# Patient Record
Sex: FEMALE | Race: WHITE | NOT HISPANIC OR LATINO | Employment: OTHER | RURAL
[De-identification: names, ages, dates, MRNs, and addresses within clinical notes are randomized per-mention and may not be internally consistent; named-entity substitution may affect disease eponyms.]

---

## 2018-09-03 ENCOUNTER — OFFICE VISIT (OUTPATIENT)
Dept: RETAIL CLINIC | Facility: CLINIC | Age: 38
End: 2018-09-03

## 2018-09-03 VITALS
WEIGHT: 156 LBS | OXYGEN SATURATION: 98 % | HEART RATE: 80 BPM | BODY MASS INDEX: 30.63 KG/M2 | TEMPERATURE: 98.9 F | RESPIRATION RATE: 12 BRPM | HEIGHT: 60 IN

## 2018-09-03 DIAGNOSIS — R21 RASH: Primary | ICD-10-CM

## 2018-09-03 PROCEDURE — 99202 OFFICE O/P NEW SF 15 MIN: CPT | Performed by: NURSE PRACTITIONER

## 2018-09-03 RX ORDER — PREDNISONE 10 MG/1
TABLET ORAL DAILY
Qty: 21 EACH | Refills: 0 | Status: SHIPPED | OUTPATIENT
Start: 2018-09-03 | End: 2018-09-09

## 2018-09-03 RX ORDER — FLUTICASONE PROPIONATE 50 MCG
2 SPRAY, SUSPENSION (ML) NASAL DAILY
COMMUNITY
End: 2019-06-09

## 2018-09-03 RX ORDER — CETIRIZINE HYDROCHLORIDE 10 MG/1
10 TABLET ORAL DAILY
COMMUNITY
End: 2019-06-09

## 2018-09-03 RX ORDER — ERGOCALCIFEROL (VITAMIN D2) 10 MCG
400 TABLET ORAL DAILY
COMMUNITY
End: 2019-06-09

## 2018-09-03 NOTE — PROGRESS NOTES
"Subjective   Roxana Knutson is a 38 y.o. female.     Rash   This is a new problem. Episode onset: 5 days. The problem is unchanged. Location: bilat lower legs. The rash is characterized by redness and itchiness. Associated with: grass. Pertinent negatives include no anorexia, cough, fatigue, fever or shortness of breath. Past treatments include anti-itch cream. The treatment provided no relief. There is no history of allergies, asthma or eczema.        The following portions of the patient's history were reviewed and updated as appropriate: allergies, current medications, past medical history, past social history, past surgical history and problem list.    Review of Systems   Constitutional: Negative.  Negative for fatigue and fever.   Respiratory: Negative.  Negative for cough and shortness of breath.    Cardiovascular: Negative.    Gastrointestinal: Negative.  Negative for anorexia.   Skin: Positive for rash (itching red rash on bilat lower leges after kneeling in the grass, worsening, spreading, unresponsive to otc medication).   Hematological: Negative.  Negative for adenopathy.        Pulse 80   Temp 98.9 °F (37.2 °C) (Oral)   Resp 12   Ht 152.4 cm (60\")   Wt 70.8 kg (156 lb)   SpO2 98%   BMI 30.47 kg/m²      Objective   Physical Exam   Constitutional: She is oriented to person, place, and time. She appears well-developed and well-nourished. No distress.   Cardiovascular: Normal rate and regular rhythm.    Pulmonary/Chest: Effort normal and breath sounds normal. No respiratory distress.   Neurological: She is alert and oriented to person, place, and time.   Skin: Skin is warm and dry. Rash (papular, red rash on bilat anterior lower legs, no pain on palpation, no exudate or other sign of infection noted) noted.        Psychiatric: She has a normal mood and affect. Her behavior is normal. Thought content normal.   Vitals reviewed.      Assessment/Plan   Roxana was seen today for rash.    Diagnoses and all " orders for this visit:    Rash  -     hydrocortisone 2.5 % cream; Apply  topically to the appropriate area as directed 2 (Two) Times a Day.  -     PredniSONE (DELTASONE) 10 MG (21) tablet pack; Take  by mouth Daily for 6 days. Use as directed on package

## 2019-06-09 ENCOUNTER — OFFICE VISIT (OUTPATIENT)
Dept: RETAIL CLINIC | Facility: CLINIC | Age: 39
End: 2019-06-09

## 2019-06-09 VITALS
HEIGHT: 60 IN | TEMPERATURE: 97.1 F | DIASTOLIC BLOOD PRESSURE: 78 MMHG | BODY MASS INDEX: 30.63 KG/M2 | HEART RATE: 86 BPM | SYSTOLIC BLOOD PRESSURE: 108 MMHG | OXYGEN SATURATION: 99 % | WEIGHT: 156 LBS | RESPIRATION RATE: 14 BRPM

## 2019-06-09 DIAGNOSIS — J02.9 SORE THROAT: Primary | ICD-10-CM

## 2019-06-09 LAB
EXPIRATION DATE: NORMAL
INTERNAL CONTROL: NORMAL
Lab: NORMAL
S PYO AG THROAT QL: NEGATIVE

## 2019-06-09 PROCEDURE — 87880 STREP A ASSAY W/OPTIC: CPT | Performed by: NURSE PRACTITIONER

## 2019-06-09 PROCEDURE — 99213 OFFICE O/P EST LOW 20 MIN: CPT | Performed by: NURSE PRACTITIONER

## 2019-06-09 NOTE — PROGRESS NOTES
"Roseann Knutson is a 38 y.o. female.   /78   Pulse 86   Temp 97.1 °F (36.2 °C)   Resp 14   Ht 152.4 cm (60\")   Wt 70.8 kg (156 lb)   LMP  (LMP Unknown)   SpO2 99%   BMI 30.47 kg/m²       Fatigue   This is a new problem. The current episode started in the past 7 days. The problem has been waxing and waning. Associated symptoms include fatigue, headaches and a sore throat (mild). Pertinent negatives include no abdominal pain, anorexia, arthralgias, change in bowel habit, chest pain, chills, congestion, coughing, diaphoresis, fever, joint swelling, myalgias, nausea, neck pain, numbness, rash, swollen glands, urinary symptoms, vertigo, visual change, vomiting or weakness.        The following portions of the patient's history were reviewed and updated as appropriate: allergies, current medications, past family history, past medical history, past social history, past surgical history and problem list.    Review of Systems   Constitutional: Positive for fatigue. Negative for chills, diaphoresis and fever.   HENT: Positive for sore throat (mild). Negative for congestion.    Respiratory: Negative for cough.    Cardiovascular: Negative for chest pain.   Gastrointestinal: Negative for abdominal pain, anorexia, change in bowel habit, nausea and vomiting.   Musculoskeletal: Negative for arthralgias, joint swelling, myalgias and neck pain.   Skin: Negative for rash.   Neurological: Positive for headaches. Negative for vertigo, weakness and numbness.       Objective   Physical Exam   Constitutional: She appears well-developed and well-nourished.  Non-toxic appearance. She does not appear ill.   HENT:   Head: Normocephalic and atraumatic.   Right Ear: Tympanic membrane and ear canal normal.   Nose: Mucosal edema and rhinorrhea present. Right sinus exhibits no maxillary sinus tenderness and no frontal sinus tenderness. Left sinus exhibits no maxillary sinus tenderness and no frontal sinus tenderness. "   Mouth/Throat: Uvula is midline and mucous membranes are normal. Posterior oropharyngeal erythema (mild) present.   Left canal is 80% occluded with cerumen .    Cardiovascular: Regular rhythm and normal heart sounds.   Pulmonary/Chest: Effort normal. She has no wheezes. She has no rhonchi. She has no rales.   Lymphadenopathy:     She has no cervical adenopathy.   Skin: Skin is warm and dry.       Assessment/Plan   Roxana was seen today for sore throat, headache and earache.    Diagnoses and all orders for this visit:    Sore throat  -     POC Rapid Strep A      Results for orders placed or performed in visit on 06/09/19   POC Rapid Strep A   Result Value Ref Range    Rapid Strep A Screen Negative Negative, VALID, INVALID, Not Performed    Internal Control Passed Passed    Lot Number [id number]     Expiration Date 10,312,020          PT advised to follow up with PCP if symptoms worsen. Call clinic for cortisporin Rx if she is out at home, as she has a history of recurrent otitis externa and ear canal eczema.

## 2020-03-10 ENCOUNTER — LAB (OUTPATIENT)
Dept: LAB | Facility: HOSPITAL | Age: 40
End: 2020-03-10

## 2020-03-10 ENCOUNTER — TRANSCRIBE ORDERS (OUTPATIENT)
Dept: LAB | Facility: HOSPITAL | Age: 40
End: 2020-03-10

## 2020-03-10 DIAGNOSIS — N95.1 SYMPTOMATIC MENOPAUSAL OR FEMALE CLIMACTERIC STATES: ICD-10-CM

## 2020-03-10 DIAGNOSIS — N95.1 SYMPTOMATIC MENOPAUSAL OR FEMALE CLIMACTERIC STATES: Primary | ICD-10-CM

## 2020-03-10 LAB — ESTRADIOL SERPL HS-MCNC: 199 PG/ML

## 2020-03-10 PROCEDURE — 82670 ASSAY OF TOTAL ESTRADIOL: CPT

## 2020-03-10 PROCEDURE — 84403 ASSAY OF TOTAL TESTOSTERONE: CPT

## 2020-03-10 PROCEDURE — 84402 ASSAY OF FREE TESTOSTERONE: CPT

## 2020-03-10 PROCEDURE — 36415 COLL VENOUS BLD VENIPUNCTURE: CPT

## 2020-03-12 LAB
TESTOST FREE SERPL-MCNC: 1.1 PG/ML (ref 0–4.2)
TESTOST SERPL-MCNC: 49 NG/DL (ref 8–48)

## 2020-06-09 ENCOUNTER — LAB (OUTPATIENT)
Dept: LAB | Facility: HOSPITAL | Age: 40
End: 2020-06-09

## 2020-06-09 ENCOUNTER — TRANSCRIBE ORDERS (OUTPATIENT)
Dept: LAB | Facility: HOSPITAL | Age: 40
End: 2020-06-09

## 2020-06-09 DIAGNOSIS — N95.1 SYMPTOMATIC MENOPAUSAL OR FEMALE CLIMACTERIC STATES: Primary | ICD-10-CM

## 2020-06-09 DIAGNOSIS — N95.1 SYMPTOMATIC MENOPAUSAL OR FEMALE CLIMACTERIC STATES: ICD-10-CM

## 2020-06-09 PROCEDURE — 84402 ASSAY OF FREE TESTOSTERONE: CPT

## 2020-06-09 PROCEDURE — 36415 COLL VENOUS BLD VENIPUNCTURE: CPT

## 2020-06-09 PROCEDURE — 84403 ASSAY OF TOTAL TESTOSTERONE: CPT

## 2020-06-09 PROCEDURE — 82670 ASSAY OF TOTAL ESTRADIOL: CPT

## 2020-06-10 LAB
ESTRADIOL SERPL HS-MCNC: 159 PG/ML
TESTOST SERPL-MCNC: 28.5 NG/DL (ref 8.4–48.1)

## 2020-06-15 LAB — TESTOST FREE SERPL-MCNC: 1.1 PG/ML (ref 0–4.2)

## 2020-08-31 ENCOUNTER — LAB (OUTPATIENT)
Dept: LAB | Facility: HOSPITAL | Age: 40
End: 2020-08-31

## 2020-08-31 ENCOUNTER — TRANSCRIBE ORDERS (OUTPATIENT)
Dept: LAB | Facility: HOSPITAL | Age: 40
End: 2020-08-31

## 2020-08-31 DIAGNOSIS — N95.1 SYMPTOMATIC MENOPAUSAL OR FEMALE CLIMACTERIC STATES: ICD-10-CM

## 2020-08-31 DIAGNOSIS — N95.1 SYMPTOMATIC MENOPAUSAL OR FEMALE CLIMACTERIC STATES: Primary | ICD-10-CM

## 2020-08-31 LAB
ESTRADIOL SERPL HS-MCNC: 178 PG/ML
TESTOST SERPL-MCNC: 34.4 NG/DL (ref 8.4–48.1)

## 2020-08-31 PROCEDURE — 84402 ASSAY OF FREE TESTOSTERONE: CPT

## 2020-08-31 PROCEDURE — 82670 ASSAY OF TOTAL ESTRADIOL: CPT

## 2020-08-31 PROCEDURE — 36415 COLL VENOUS BLD VENIPUNCTURE: CPT

## 2020-08-31 PROCEDURE — 84403 ASSAY OF TOTAL TESTOSTERONE: CPT

## 2020-09-03 LAB — TESTOST FREE SERPL-MCNC: 0.5 PG/ML (ref 0–4.2)

## 2020-11-23 ENCOUNTER — LAB (OUTPATIENT)
Dept: LAB | Facility: HOSPITAL | Age: 40
End: 2020-11-23

## 2020-11-23 ENCOUNTER — TRANSCRIBE ORDERS (OUTPATIENT)
Dept: LAB | Facility: HOSPITAL | Age: 40
End: 2020-11-23

## 2020-11-23 DIAGNOSIS — N95.1 SYMPTOMATIC MENOPAUSAL OR FEMALE CLIMACTERIC STATES: Primary | ICD-10-CM

## 2020-11-23 DIAGNOSIS — N95.1 SYMPTOMATIC MENOPAUSAL OR FEMALE CLIMACTERIC STATES: ICD-10-CM

## 2020-11-23 PROCEDURE — 84402 ASSAY OF FREE TESTOSTERONE: CPT

## 2020-11-23 PROCEDURE — 84403 ASSAY OF TOTAL TESTOSTERONE: CPT

## 2020-11-23 PROCEDURE — 82670 ASSAY OF TOTAL ESTRADIOL: CPT

## 2020-11-23 PROCEDURE — 36415 COLL VENOUS BLD VENIPUNCTURE: CPT

## 2020-11-24 LAB
ESTRADIOL SERPL HS-MCNC: 182 PG/ML
TESTOST SERPL-MCNC: 42.4 NG/DL (ref 8.4–48.1)

## 2020-11-26 LAB — TESTOST FREE SERPL-MCNC: 2.1 PG/ML (ref 0–4.2)

## 2021-02-23 ENCOUNTER — LAB (OUTPATIENT)
Dept: LAB | Facility: HOSPITAL | Age: 41
End: 2021-02-23

## 2021-02-23 ENCOUNTER — TRANSCRIBE ORDERS (OUTPATIENT)
Dept: LAB | Facility: HOSPITAL | Age: 41
End: 2021-02-23

## 2021-02-23 DIAGNOSIS — N95.1 SYMPTOMATIC MENOPAUSAL OR FEMALE CLIMACTERIC STATES: Primary | ICD-10-CM

## 2021-02-23 DIAGNOSIS — Z79.890 NEED FOR PROPHYLACTIC HORMONE REPLACEMENT THERAPY (POSTMENOPAUSAL): ICD-10-CM

## 2021-02-23 DIAGNOSIS — N95.1 SYMPTOMATIC MENOPAUSAL OR FEMALE CLIMACTERIC STATES: ICD-10-CM

## 2021-02-23 PROCEDURE — 84403 ASSAY OF TOTAL TESTOSTERONE: CPT

## 2021-02-23 PROCEDURE — 84402 ASSAY OF FREE TESTOSTERONE: CPT

## 2021-02-23 PROCEDURE — 36415 COLL VENOUS BLD VENIPUNCTURE: CPT | Performed by: OBSTETRICS & GYNECOLOGY

## 2021-02-23 PROCEDURE — 82670 ASSAY OF TOTAL ESTRADIOL: CPT | Performed by: OBSTETRICS & GYNECOLOGY

## 2021-02-24 LAB — ESTRADIOL SERPL HS-MCNC: 199 PG/ML

## 2021-02-26 LAB
TESTOST FREE SERPL-MCNC: 1.7 PG/ML (ref 0–4.2)
TESTOST SERPL-MCNC: 46 NG/DL (ref 8–48)

## 2021-05-24 ENCOUNTER — LAB (OUTPATIENT)
Dept: LAB | Facility: HOSPITAL | Age: 41
End: 2021-05-24

## 2021-05-24 ENCOUNTER — TRANSCRIBE ORDERS (OUTPATIENT)
Dept: LAB | Facility: HOSPITAL | Age: 41
End: 2021-05-24

## 2021-05-24 DIAGNOSIS — N95.1 SYMPTOMATIC MENOPAUSAL OR FEMALE CLIMACTERIC STATES: Primary | ICD-10-CM

## 2021-05-24 DIAGNOSIS — Z79.890 NEED FOR PROPHYLACTIC HORMONE REPLACEMENT THERAPY (POSTMENOPAUSAL): ICD-10-CM

## 2021-05-24 DIAGNOSIS — N95.1 SYMPTOMATIC MENOPAUSAL OR FEMALE CLIMACTERIC STATES: ICD-10-CM

## 2021-05-24 LAB
ESTRADIOL SERPL HS-MCNC: 187 PG/ML
FSH SERPL-ACNC: 1.21 MIU/ML
TESTOST SERPL-MCNC: 39.7 NG/DL (ref 8.4–48.1)

## 2021-05-24 PROCEDURE — 84403 ASSAY OF TOTAL TESTOSTERONE: CPT

## 2021-05-24 PROCEDURE — 83001 ASSAY OF GONADOTROPIN (FSH): CPT

## 2021-05-24 PROCEDURE — 84402 ASSAY OF FREE TESTOSTERONE: CPT

## 2021-05-24 PROCEDURE — 82670 ASSAY OF TOTAL ESTRADIOL: CPT | Performed by: OBSTETRICS & GYNECOLOGY

## 2021-05-24 PROCEDURE — 36415 COLL VENOUS BLD VENIPUNCTURE: CPT | Performed by: OBSTETRICS & GYNECOLOGY

## 2021-05-29 LAB — TESTOST FREE SERPL-MCNC: 1.6 PG/ML (ref 0–4.2)

## 2021-08-17 ENCOUNTER — LAB (OUTPATIENT)
Dept: LAB | Facility: HOSPITAL | Age: 41
End: 2021-08-17

## 2021-08-17 ENCOUNTER — TRANSCRIBE ORDERS (OUTPATIENT)
Dept: LAB | Facility: HOSPITAL | Age: 41
End: 2021-08-17

## 2021-08-17 DIAGNOSIS — N95.1 SYMPTOMATIC MENOPAUSAL OR FEMALE CLIMACTERIC STATES: ICD-10-CM

## 2021-08-17 DIAGNOSIS — Z79.890 NEED FOR PROPHYLACTIC HORMONE REPLACEMENT THERAPY (POSTMENOPAUSAL): ICD-10-CM

## 2021-08-17 DIAGNOSIS — N95.1 SYMPTOMATIC MENOPAUSAL OR FEMALE CLIMACTERIC STATES: Primary | ICD-10-CM

## 2021-08-17 LAB
ESTRADIOL SERPL HS-MCNC: 187 PG/ML
FSH SERPL-ACNC: 0.49 MIU/ML
TESTOST SERPL-MCNC: 36.5 NG/DL (ref 8.4–48.1)

## 2021-08-17 PROCEDURE — 83001 ASSAY OF GONADOTROPIN (FSH): CPT

## 2021-08-17 PROCEDURE — 36415 COLL VENOUS BLD VENIPUNCTURE: CPT

## 2021-08-17 PROCEDURE — 82670 ASSAY OF TOTAL ESTRADIOL: CPT

## 2021-08-17 PROCEDURE — 84402 ASSAY OF FREE TESTOSTERONE: CPT

## 2021-08-17 PROCEDURE — 84403 ASSAY OF TOTAL TESTOSTERONE: CPT

## 2021-08-24 LAB — TESTOST FREE SERPL-MCNC: 0.6 PG/ML (ref 0–4.2)

## 2021-11-09 DIAGNOSIS — M54.32 SCIATICA OF LEFT SIDE: Primary | ICD-10-CM

## 2021-11-09 RX ORDER — GABAPENTIN 100 MG/1
100 CAPSULE ORAL NIGHTLY
Qty: 60 CAPSULE | Refills: 0 | Status: SHIPPED | OUTPATIENT
Start: 2021-11-09

## 2021-11-11 ENCOUNTER — TRANSCRIBE ORDERS (OUTPATIENT)
Dept: LAB | Facility: HOSPITAL | Age: 41
End: 2021-11-11

## 2021-11-11 ENCOUNTER — LAB (OUTPATIENT)
Dept: LAB | Facility: HOSPITAL | Age: 41
End: 2021-11-11

## 2021-11-11 DIAGNOSIS — N95.1 SYMPTOMATIC MENOPAUSAL OR FEMALE CLIMACTERIC STATES: Primary | ICD-10-CM

## 2021-11-11 DIAGNOSIS — Z79.890 NEED FOR PROPHYLACTIC HORMONE REPLACEMENT THERAPY (POSTMENOPAUSAL): ICD-10-CM

## 2021-11-11 LAB
ESTRADIOL SERPL HS-MCNC: 186 PG/ML
FSH SERPL-ACNC: 0.71 MIU/ML
TESTOST SERPL-MCNC: 48.1 NG/DL (ref 8.4–48.1)

## 2021-11-11 PROCEDURE — 82670 ASSAY OF TOTAL ESTRADIOL: CPT | Performed by: OBSTETRICS & GYNECOLOGY

## 2021-11-11 PROCEDURE — 84402 ASSAY OF FREE TESTOSTERONE: CPT | Performed by: OBSTETRICS & GYNECOLOGY

## 2021-11-11 PROCEDURE — 36415 COLL VENOUS BLD VENIPUNCTURE: CPT | Performed by: OBSTETRICS & GYNECOLOGY

## 2021-11-11 PROCEDURE — 83001 ASSAY OF GONADOTROPIN (FSH): CPT | Performed by: OBSTETRICS & GYNECOLOGY

## 2021-11-11 PROCEDURE — 84403 ASSAY OF TOTAL TESTOSTERONE: CPT | Performed by: OBSTETRICS & GYNECOLOGY

## 2021-11-13 LAB — TESTOST FREE SERPL-MCNC: 2.3 PG/ML (ref 0–4.2)

## 2022-02-07 ENCOUNTER — LAB (OUTPATIENT)
Dept: LAB | Facility: HOSPITAL | Age: 42
End: 2022-02-07

## 2022-02-07 ENCOUNTER — TRANSCRIBE ORDERS (OUTPATIENT)
Dept: LAB | Facility: HOSPITAL | Age: 42
End: 2022-02-07

## 2022-02-07 DIAGNOSIS — Z79.890 NEED FOR PROPHYLACTIC HORMONE REPLACEMENT THERAPY (POSTMENOPAUSAL): ICD-10-CM

## 2022-02-07 DIAGNOSIS — N95.1 SYMPTOMATIC MENOPAUSAL OR FEMALE CLIMACTERIC STATES: ICD-10-CM

## 2022-02-07 DIAGNOSIS — N95.1 SYMPTOMATIC MENOPAUSAL OR FEMALE CLIMACTERIC STATES: Primary | ICD-10-CM

## 2022-02-07 PROCEDURE — 83001 ASSAY OF GONADOTROPIN (FSH): CPT

## 2022-02-07 PROCEDURE — 84402 ASSAY OF FREE TESTOSTERONE: CPT

## 2022-02-07 PROCEDURE — 82670 ASSAY OF TOTAL ESTRADIOL: CPT | Performed by: OBSTETRICS & GYNECOLOGY

## 2022-02-07 PROCEDURE — 36415 COLL VENOUS BLD VENIPUNCTURE: CPT | Performed by: OBSTETRICS & GYNECOLOGY

## 2022-02-07 PROCEDURE — 84403 ASSAY OF TOTAL TESTOSTERONE: CPT

## 2022-02-08 LAB
ESTRADIOL SERPL HS-MCNC: 150 PG/ML
FSH SERPL-ACNC: 0.82 MIU/ML
TESTOST FREE SERPL-MCNC: 2.9 PG/ML (ref 0–4.2)
TESTOST SERPL-MCNC: 41.6 NG/DL (ref 8.4–48.1)

## 2022-05-02 ENCOUNTER — LAB (OUTPATIENT)
Dept: LAB | Facility: HOSPITAL | Age: 42
End: 2022-05-02

## 2022-05-02 ENCOUNTER — TRANSCRIBE ORDERS (OUTPATIENT)
Dept: LAB | Facility: HOSPITAL | Age: 42
End: 2022-05-02

## 2022-05-02 DIAGNOSIS — Z79.890 NEED FOR PROPHYLACTIC HORMONE REPLACEMENT THERAPY (POSTMENOPAUSAL): ICD-10-CM

## 2022-05-02 DIAGNOSIS — N95.1 SYMPTOMATIC MENOPAUSAL OR FEMALE CLIMACTERIC STATES: ICD-10-CM

## 2022-05-02 DIAGNOSIS — N95.1 SYMPTOMATIC MENOPAUSAL OR FEMALE CLIMACTERIC STATES: Primary | ICD-10-CM

## 2022-05-02 LAB
ESTRADIOL SERPL HS-MCNC: 182 PG/ML
FSH SERPL-ACNC: 0.6 MIU/ML
TESTOST SERPL-MCNC: 42.3 NG/DL (ref 8.4–48.1)

## 2022-05-02 PROCEDURE — 36415 COLL VENOUS BLD VENIPUNCTURE: CPT

## 2022-05-02 PROCEDURE — 84403 ASSAY OF TOTAL TESTOSTERONE: CPT

## 2022-05-02 PROCEDURE — 82670 ASSAY OF TOTAL ESTRADIOL: CPT

## 2022-05-02 PROCEDURE — 83001 ASSAY OF GONADOTROPIN (FSH): CPT

## 2022-05-02 PROCEDURE — 84402 ASSAY OF FREE TESTOSTERONE: CPT

## 2022-05-05 LAB — TESTOST FREE SERPL-MCNC: 1.6 PG/ML (ref 0–4.2)

## 2022-07-27 ENCOUNTER — TRANSCRIBE ORDERS (OUTPATIENT)
Dept: LAB | Facility: HOSPITAL | Age: 42
End: 2022-07-27

## 2022-07-27 ENCOUNTER — LAB (OUTPATIENT)
Dept: LAB | Facility: HOSPITAL | Age: 42
End: 2022-07-27

## 2022-07-27 DIAGNOSIS — N95.1 SYMPTOMATIC MENOPAUSAL OR FEMALE CLIMACTERIC STATES: Primary | ICD-10-CM

## 2022-07-27 DIAGNOSIS — Z79.890 NEED FOR PROPHYLACTIC HORMONE REPLACEMENT THERAPY (POSTMENOPAUSAL): ICD-10-CM

## 2022-07-27 DIAGNOSIS — N95.1 SYMPTOMATIC MENOPAUSAL OR FEMALE CLIMACTERIC STATES: ICD-10-CM

## 2022-07-27 LAB
ESTRADIOL SERPL HS-MCNC: 189 PG/ML
FSH SERPL-ACNC: 0.44 MIU/ML
TESTOST SERPL-MCNC: 41.4 NG/DL (ref 8.4–48.1)

## 2022-07-27 PROCEDURE — 36415 COLL VENOUS BLD VENIPUNCTURE: CPT | Performed by: OBSTETRICS & GYNECOLOGY

## 2022-07-27 PROCEDURE — 84402 ASSAY OF FREE TESTOSTERONE: CPT

## 2022-07-27 PROCEDURE — 82670 ASSAY OF TOTAL ESTRADIOL: CPT | Performed by: OBSTETRICS & GYNECOLOGY

## 2022-07-27 PROCEDURE — 84403 ASSAY OF TOTAL TESTOSTERONE: CPT

## 2022-07-27 PROCEDURE — 83001 ASSAY OF GONADOTROPIN (FSH): CPT

## 2022-08-01 ENCOUNTER — TRANSCRIBE ORDERS (OUTPATIENT)
Dept: ADMINISTRATIVE | Facility: HOSPITAL | Age: 42
End: 2022-08-01

## 2022-08-01 DIAGNOSIS — Z12.31 VISIT FOR SCREENING MAMMOGRAM: Primary | ICD-10-CM

## 2022-08-02 LAB — TESTOST FREE SERPL-MCNC: 1.6 PG/ML (ref 0–4.2)

## 2022-08-30 ENCOUNTER — HOSPITAL ENCOUNTER (OUTPATIENT)
Dept: MAMMOGRAPHY | Facility: HOSPITAL | Age: 42
Discharge: HOME | End: 2022-08-30
Admitting: OBSTETRICS & GYNECOLOGY

## 2022-08-30 DIAGNOSIS — Z12.31 VISIT FOR SCREENING MAMMOGRAM: ICD-10-CM

## 2022-08-30 PROCEDURE — 77067 SCR MAMMO BI INCL CAD: CPT | Performed by: RADIOLOGY

## 2022-08-30 PROCEDURE — 77063 BREAST TOMOSYNTHESIS BI: CPT

## 2022-08-30 PROCEDURE — 77067 SCR MAMMO BI INCL CAD: CPT

## 2022-08-30 PROCEDURE — 77063 BREAST TOMOSYNTHESIS BI: CPT | Performed by: RADIOLOGY

## 2022-10-27 ENCOUNTER — TRANSCRIBE ORDERS (OUTPATIENT)
Dept: LAB | Facility: HOSPITAL | Age: 42
End: 2022-10-27

## 2022-10-27 ENCOUNTER — LAB (OUTPATIENT)
Dept: LAB | Facility: HOSPITAL | Age: 42
End: 2022-10-27

## 2022-10-27 DIAGNOSIS — Z79.890 NEED FOR PROPHYLACTIC HORMONE REPLACEMENT THERAPY (POSTMENOPAUSAL): ICD-10-CM

## 2022-10-27 DIAGNOSIS — N95.1 SYMPTOMATIC MENOPAUSAL OR FEMALE CLIMACTERIC STATES: ICD-10-CM

## 2022-10-27 DIAGNOSIS — N95.1 SYMPTOMATIC MENOPAUSAL OR FEMALE CLIMACTERIC STATES: Primary | ICD-10-CM

## 2022-10-27 PROCEDURE — 36415 COLL VENOUS BLD VENIPUNCTURE: CPT

## 2022-10-27 PROCEDURE — 83001 ASSAY OF GONADOTROPIN (FSH): CPT

## 2022-10-27 PROCEDURE — 84402 ASSAY OF FREE TESTOSTERONE: CPT

## 2022-10-27 PROCEDURE — 82670 ASSAY OF TOTAL ESTRADIOL: CPT | Performed by: OBSTETRICS & GYNECOLOGY

## 2022-10-27 PROCEDURE — 84403 ASSAY OF TOTAL TESTOSTERONE: CPT

## 2022-10-28 LAB
ESTRADIOL SERPL HS-MCNC: 148 PG/ML
FSH SERPL-ACNC: 0.6 MIU/ML
TESTOST SERPL-MCNC: 31.6 NG/DL (ref 8.4–48.1)

## 2022-11-02 LAB — TESTOST FREE SERPL-MCNC: 0.9 PG/ML (ref 0–4.2)

## 2022-11-17 ENCOUNTER — HOSPITAL ENCOUNTER (OUTPATIENT)
Dept: MAMMOGRAPHY | Facility: HOSPITAL | Age: 42
Discharge: HOME | End: 2022-11-17

## 2022-11-17 ENCOUNTER — HOSPITAL ENCOUNTER (OUTPATIENT)
Dept: ULTRASOUND IMAGING | Facility: HOSPITAL | Age: 42
Discharge: HOME | End: 2022-11-17

## 2022-11-17 DIAGNOSIS — R92.8 ABNORMAL MAMMOGRAM: ICD-10-CM

## 2022-11-17 PROCEDURE — 77065 DX MAMMO INCL CAD UNI: CPT

## 2022-11-17 PROCEDURE — 76642 ULTRASOUND BREAST LIMITED: CPT

## 2022-11-17 PROCEDURE — 76642 ULTRASOUND BREAST LIMITED: CPT | Performed by: RADIOLOGY

## 2022-11-17 PROCEDURE — 77061 BREAST TOMOSYNTHESIS UNI: CPT | Performed by: RADIOLOGY

## 2022-11-17 PROCEDURE — 77065 DX MAMMO INCL CAD UNI: CPT | Performed by: RADIOLOGY

## 2022-11-17 PROCEDURE — G0279 TOMOSYNTHESIS, MAMMO: HCPCS

## 2023-02-01 RX ORDER — ONDANSETRON 4 MG/1
4 TABLET, FILM COATED ORAL EVERY 8 HOURS PRN
Qty: 20 TABLET | Refills: 1 | Status: SHIPPED | OUTPATIENT
Start: 2023-02-01

## 2023-02-01 RX ORDER — ONDANSETRON 4 MG/1
4 TABLET, ORALLY DISINTEGRATING ORAL EVERY 8 HOURS PRN
Qty: 12 TABLET | Refills: 1 | Status: SHIPPED | OUTPATIENT
Start: 2023-02-01

## 2023-03-29 ENCOUNTER — TRANSCRIBE ORDERS (OUTPATIENT)
Dept: CARDIOLOGY | Facility: CLINIC | Age: 43
End: 2023-03-29
Payer: COMMERCIAL

## 2023-03-29 DIAGNOSIS — R00.2 PALPITATIONS: Primary | ICD-10-CM

## 2023-10-10 ENCOUNTER — TRANSCRIBE ORDERS (OUTPATIENT)
Dept: ADMINISTRATIVE | Facility: HOSPITAL | Age: 43
End: 2023-10-10
Payer: COMMERCIAL

## 2023-10-10 DIAGNOSIS — Z12.31 SCREENING MAMMOGRAM FOR BREAST CANCER: Primary | ICD-10-CM

## 2023-11-22 ENCOUNTER — HOSPITAL ENCOUNTER (OUTPATIENT)
Dept: MAMMOGRAPHY | Facility: HOSPITAL | Age: 43
Discharge: HOME | End: 2023-11-22
Admitting: OBSTETRICS & GYNECOLOGY
Payer: COMMERCIAL

## 2023-11-22 DIAGNOSIS — Z12.31 SCREENING MAMMOGRAM FOR BREAST CANCER: ICD-10-CM

## 2023-11-22 PROCEDURE — 77063 BREAST TOMOSYNTHESIS BI: CPT

## 2023-11-22 PROCEDURE — 77067 SCR MAMMO BI INCL CAD: CPT

## 2024-08-16 ENCOUNTER — HOSPITAL ENCOUNTER (EMERGENCY)
Facility: HOSPITAL | Age: 44
Discharge: HOME | End: 2024-08-16
Attending: EMERGENCY MEDICINE
Payer: COMMERCIAL

## 2024-08-16 ENCOUNTER — APPOINTMENT (OUTPATIENT)
Facility: HOSPITAL | Age: 44
End: 2024-08-16
Payer: COMMERCIAL

## 2024-08-16 VITALS
TEMPERATURE: 97.9 F | DIASTOLIC BLOOD PRESSURE: 71 MMHG | BODY MASS INDEX: 31.02 KG/M2 | WEIGHT: 158 LBS | HEIGHT: 60 IN | HEART RATE: 85 BPM | SYSTOLIC BLOOD PRESSURE: 138 MMHG | RESPIRATION RATE: 18 BRPM | OXYGEN SATURATION: 99 %

## 2024-08-16 DIAGNOSIS — N23 RENAL COLIC: Primary | ICD-10-CM

## 2024-08-16 LAB
ALBUMIN SERPL-MCNC: 4.4 G/DL (ref 3.5–5.2)
ALBUMIN/GLOB SERPL: 1.9 G/DL
ALP SERPL-CCNC: 77 U/L (ref 39–117)
ALT SERPL W P-5'-P-CCNC: 24 U/L (ref 1–33)
ANION GAP SERPL CALCULATED.3IONS-SCNC: 11.3 MMOL/L (ref 5–15)
AST SERPL-CCNC: 24 U/L (ref 1–32)
BASOPHILS # BLD AUTO: 0.02 10*3/MM3 (ref 0–0.2)
BASOPHILS NFR BLD AUTO: 0.2 % (ref 0–1.5)
BILIRUB SERPL-MCNC: 0.5 MG/DL (ref 0–1.2)
BILIRUB UR QL STRIP: NEGATIVE
BUN SERPL-MCNC: 12 MG/DL (ref 6–20)
BUN/CREAT SERPL: 13.5 (ref 7–25)
CALCIUM SPEC-SCNC: 9.3 MG/DL (ref 8.6–10.5)
CHLORIDE SERPL-SCNC: 105 MMOL/L (ref 98–107)
CLARITY UR: CLEAR
CO2 SERPL-SCNC: 23.7 MMOL/L (ref 22–29)
COLOR UR: YELLOW
CREAT SERPL-MCNC: 0.89 MG/DL (ref 0.57–1)
DEPRECATED RDW RBC AUTO: 35.5 FL (ref 37–54)
EGFRCR SERPLBLD CKD-EPI 2021: 83.1 ML/MIN/1.73
EOSINOPHIL # BLD AUTO: 0.14 10*3/MM3 (ref 0–0.4)
EOSINOPHIL NFR BLD AUTO: 1.6 % (ref 0.3–6.2)
ERYTHROCYTE [DISTWIDTH] IN BLOOD BY AUTOMATED COUNT: 11.6 % (ref 12.3–15.4)
GLOBULIN UR ELPH-MCNC: 2.3 GM/DL
GLUCOSE SERPL-MCNC: 128 MG/DL (ref 65–99)
GLUCOSE UR STRIP-MCNC: NEGATIVE MG/DL
HCT VFR BLD AUTO: 43.8 % (ref 34–46.6)
HGB BLD-MCNC: 15.3 G/DL (ref 12–15.9)
HGB UR QL STRIP.AUTO: NEGATIVE
HOLD SPECIMEN: NORMAL
IMM GRANULOCYTES # BLD AUTO: 0.01 10*3/MM3 (ref 0–0.05)
IMM GRANULOCYTES NFR BLD AUTO: 0.1 % (ref 0–0.5)
KETONES UR QL STRIP: NEGATIVE
LEUKOCYTE ESTERASE UR QL STRIP.AUTO: NEGATIVE
LIPASE SERPL-CCNC: 21 U/L (ref 13–60)
LYMPHOCYTES # BLD AUTO: 2.7 10*3/MM3 (ref 0.7–3.1)
LYMPHOCYTES NFR BLD AUTO: 30 % (ref 19.6–45.3)
MCH RBC QN AUTO: 29.4 PG (ref 26.6–33)
MCHC RBC AUTO-ENTMCNC: 34.9 G/DL (ref 31.5–35.7)
MCV RBC AUTO: 84.1 FL (ref 79–97)
MONOCYTES # BLD AUTO: 0.76 10*3/MM3 (ref 0.1–0.9)
MONOCYTES NFR BLD AUTO: 8.4 % (ref 5–12)
NEUTROPHILS NFR BLD AUTO: 5.38 10*3/MM3 (ref 1.7–7)
NEUTROPHILS NFR BLD AUTO: 59.7 % (ref 42.7–76)
NITRITE UR QL STRIP: NEGATIVE
PH UR STRIP.AUTO: >=9 [PH] (ref 5–8)
PLATELET # BLD AUTO: 220 10*3/MM3 (ref 140–450)
PMV BLD AUTO: 11.2 FL (ref 6–12)
POTASSIUM SERPL-SCNC: 3.8 MMOL/L (ref 3.5–5.2)
PROT SERPL-MCNC: 6.7 G/DL (ref 6–8.5)
PROT UR QL STRIP: NEGATIVE
RBC # BLD AUTO: 5.21 10*6/MM3 (ref 3.77–5.28)
SODIUM SERPL-SCNC: 140 MMOL/L (ref 136–145)
SP GR UR STRIP: 1.01 (ref 1–1.03)
UROBILINOGEN UR QL STRIP: ABNORMAL
WBC NRBC COR # BLD AUTO: 9.01 10*3/MM3 (ref 3.4–10.8)
WHOLE BLOOD HOLD COAG: NORMAL
WHOLE BLOOD HOLD SPECIMEN: NORMAL

## 2024-08-16 PROCEDURE — 25010000002 HYDROMORPHONE PER 4 MG: Performed by: EMERGENCY MEDICINE

## 2024-08-16 PROCEDURE — 25010000002 KETOROLAC TROMETHAMINE PER 15 MG: Performed by: EMERGENCY MEDICINE

## 2024-08-16 PROCEDURE — 80053 COMPREHEN METABOLIC PANEL: CPT | Performed by: EMERGENCY MEDICINE

## 2024-08-16 PROCEDURE — 25010000002 ONDANSETRON PER 1 MG: Performed by: EMERGENCY MEDICINE

## 2024-08-16 PROCEDURE — 96376 TX/PRO/DX INJ SAME DRUG ADON: CPT

## 2024-08-16 PROCEDURE — 96374 THER/PROPH/DIAG INJ IV PUSH: CPT

## 2024-08-16 PROCEDURE — 74177 CT ABD & PELVIS W/CONTRAST: CPT

## 2024-08-16 PROCEDURE — 96375 TX/PRO/DX INJ NEW DRUG ADDON: CPT

## 2024-08-16 PROCEDURE — 25010000002 HYDROMORPHONE 1 MG/ML SOLUTION: Performed by: EMERGENCY MEDICINE

## 2024-08-16 PROCEDURE — 25510000001 IOPAMIDOL 61 % SOLUTION: Performed by: EMERGENCY MEDICINE

## 2024-08-16 PROCEDURE — 81003 URINALYSIS AUTO W/O SCOPE: CPT | Performed by: EMERGENCY MEDICINE

## 2024-08-16 PROCEDURE — 85025 COMPLETE CBC W/AUTO DIFF WBC: CPT | Performed by: EMERGENCY MEDICINE

## 2024-08-16 PROCEDURE — 83690 ASSAY OF LIPASE: CPT | Performed by: EMERGENCY MEDICINE

## 2024-08-16 PROCEDURE — 99285 EMERGENCY DEPT VISIT HI MDM: CPT

## 2024-08-16 RX ORDER — HYDROCODONE BITARTRATE AND ACETAMINOPHEN 7.5; 325 MG/1; MG/1
1-2 TABLET ORAL EVERY 6 HOURS PRN
Qty: 15 TABLET | Refills: 0 | Status: SHIPPED | OUTPATIENT
Start: 2024-08-16 | End: 2024-08-21

## 2024-08-16 RX ORDER — KETOROLAC TROMETHAMINE 15 MG/ML
15 INJECTION, SOLUTION INTRAMUSCULAR; INTRAVENOUS ONCE
Status: COMPLETED | OUTPATIENT
Start: 2024-08-16 | End: 2024-08-16

## 2024-08-16 RX ORDER — ONDANSETRON 4 MG/1
4 TABLET, ORALLY DISINTEGRATING ORAL EVERY 4 HOURS PRN
Qty: 15 TABLET | Refills: 0 | Status: SHIPPED | OUTPATIENT
Start: 2024-08-16 | End: 2024-08-21

## 2024-08-16 RX ORDER — ONDANSETRON 2 MG/ML
4 INJECTION INTRAMUSCULAR; INTRAVENOUS ONCE
Status: COMPLETED | OUTPATIENT
Start: 2024-08-16 | End: 2024-08-16

## 2024-08-16 RX ORDER — HYDROMORPHONE HYDROCHLORIDE 1 MG/ML
0.5 INJECTION, SOLUTION INTRAMUSCULAR; INTRAVENOUS; SUBCUTANEOUS ONCE
Status: COMPLETED | OUTPATIENT
Start: 2024-08-16 | End: 2024-08-16

## 2024-08-16 RX ORDER — SODIUM CHLORIDE 9 MG/ML
10 INJECTION, SOLUTION INTRAMUSCULAR; INTRAVENOUS; SUBCUTANEOUS AS NEEDED
Status: DISCONTINUED | OUTPATIENT
Start: 2024-08-16 | End: 2024-08-16 | Stop reason: HOSPADM

## 2024-08-16 RX ADMIN — HYDROMORPHONE HYDROCHLORIDE 0.5 MG: 1 INJECTION, SOLUTION INTRAMUSCULAR; INTRAVENOUS; SUBCUTANEOUS at 08:41

## 2024-08-16 RX ADMIN — HYDROMORPHONE HYDROCHLORIDE 0.5 MG: 1 INJECTION, SOLUTION INTRAMUSCULAR; INTRAVENOUS; SUBCUTANEOUS at 09:17

## 2024-08-16 RX ADMIN — HYDROMORPHONE HYDROCHLORIDE 1 MG: 1 INJECTION, SOLUTION INTRAMUSCULAR; INTRAVENOUS; SUBCUTANEOUS at 10:09

## 2024-08-16 RX ADMIN — ONDANSETRON 4 MG: 2 INJECTION INTRAMUSCULAR; INTRAVENOUS at 08:52

## 2024-08-16 RX ADMIN — KETOROLAC TROMETHAMINE 15 MG: 15 INJECTION, SOLUTION INTRAMUSCULAR; INTRAVENOUS at 07:55

## 2024-08-16 RX ADMIN — IOPAMIDOL 100 ML: 612 INJECTION, SOLUTION INTRAVENOUS at 08:02

## 2024-08-16 NOTE — FSED PROVIDER NOTE
"Subjective  History of Present Illness:    Patient presents with right lower quadrant abdominal pain that woke her up out of her sleep around 5:00 this morning.  Patient describes it as a sharp constant pain.  Patient has previous history of kidney stones however this feels completely different.  No associated nausea vomiting fevers chills no chest pain or shortness of breath      Nurses Notes reviewed and agree, including vitals, allergies, social history and prior medical history.     REVIEW OF SYSTEMS: All systems reviewed and not pertinent unless noted.  Review of Systems    Past Medical History:   Diagnosis Date    Asthma     Eczema of external ear     leads to otitis externa, sees ENT for this    Kidney stone     Migraines     Urinary tract infection        Allergies:    Patient has no known allergies.      Past Surgical History:   Procedure Laterality Date    HYSTERECTOMY  2018    OOPHORECTOMY Bilateral 2018    REDUCTION MAMMAPLASTY Bilateral     2014         Social History     Socioeconomic History    Marital status:    Tobacco Use    Smoking status: Never         Family History   Problem Relation Age of Onset    Cancer Mother     Breast cancer Maternal Grandmother         DX AGE 70'S    Cancer Maternal Grandmother     Diabetes Maternal Grandmother     Breast cancer Paternal Aunt 30    Cancer Maternal Grandfather     Diabetes Maternal Grandfather     Ovarian cancer Neg Hx        Objective  Physical Exam:  /71 (BP Location: Left arm, Patient Position: Sitting)   Pulse 85   Temp 97.9 °F (36.6 °C) (Oral)   Resp 18   Ht 152.4 cm (60\")   Wt 71.7 kg (158 lb)   LMP  (LMP Unknown)   SpO2 99%   BMI 30.86 kg/m²      Physical Exam  Vitals and nursing note reviewed.   Constitutional:       General: She is not in acute distress.     Appearance: Normal appearance. She is not ill-appearing, toxic-appearing or diaphoretic.   HENT:      Head: Normocephalic and atraumatic.      Nose: Nose normal.      " Mouth/Throat:      Mouth: Mucous membranes are moist.   Eyes:      Conjunctiva/sclera: Conjunctivae normal.      Pupils: Pupils are equal, round, and reactive to light.   Cardiovascular:      Rate and Rhythm: Normal rate and regular rhythm.      Pulses: Normal pulses.      Heart sounds: Normal heart sounds.   Pulmonary:      Effort: Pulmonary effort is normal.      Breath sounds: Normal breath sounds.   Abdominal:      General: Abdomen is flat.      Tenderness: There is abdominal tenderness in the right lower quadrant. There is guarding.   Musculoskeletal:         General: Normal range of motion.      Cervical back: Normal range of motion.   Skin:     General: Skin is warm and dry.      Capillary Refill: Capillary refill takes less than 2 seconds.   Neurological:      General: No focal deficit present.      Mental Status: She is alert and oriented to person, place, and time.   Psychiatric:         Mood and Affect: Mood normal.         Behavior: Behavior normal.         Procedures    ED Course:         Lab Results (last 24 hours)       Procedure Component Value Units Date/Time    CBC & Differential [[id number]]  (Abnormal) Collected: 08/16/24 0739    Specimen: Blood Updated: 08/16/24 0749    Narrative:      The following orders were created for panel order CBC & Differential.  Procedure                               Abnormality         Status                     ---------                               -----------         ------                     CBC Auto Differential[[id number]]        Abnormal            Final result                 Please view results for these tests on the individual orders.    Comprehensive Metabolic Panel [[id number]] Collected: 08/16/24 0739    Specimen: Blood Updated: 08/16/24 0744    Lipase [[id number]] Collected: 08/16/24 0739    Specimen: Blood Updated: 08/16/24 0744    CBC Auto Differential [[id number]]  (Abnormal) Collected: 08/16/24 0739    Specimen: Blood Updated: 08/16/24 0749     WBC  9.01 10*3/mm3      RBC 5.21 10*6/mm3      Hemoglobin 15.3 g/dL      Hematocrit 43.8 %      MCV 84.1 fL      MCH 29.4 pg      MCHC 34.9 g/dL      RDW 11.6 %      RDW-SD 35.5 fl      MPV 11.2 fL      Platelets 220 10*3/mm3      Neutrophil % 59.7 %      Lymphocyte % 30.0 %      Monocyte % 8.4 %      Eosinophil % 1.6 %      Basophil % 0.2 %      Immature Grans % 0.1 %      Neutrophils, Absolute 5.38 10*3/mm3      Lymphocytes, Absolute 2.70 10*3/mm3      Monocytes, Absolute 0.76 10*3/mm3      Eosinophils, Absolute 0.14 10*3/mm3      Basophils, Absolute 0.02 10*3/mm3      Immature Grans, Absolute 0.01 10*3/mm3              No radiology results from the last 24 hrs       MDM      In summary patient presenting with abdominal pain concern for possible bowel obstruction cholecystitis, gastritis, kidney stone, bowel perforation diverticulitis.  Patient given 15 IV Toradol for IV Zofran.  CBC CMP lipase all unremarkable for any acute process.  Patient still in discomfort upon reassessment patient given 0.5 mg IV Dilaudid followed by another dose.  Patient CAT scan abdomen pelvis shows 3 mm UVJ stone with mild hydronephrosis.  There was an incidental finding on the liver patient was informed of this finding and the need to follow-up with primary care for further outpatient workup.  Patient additional 1 mg Dilaudid IV.  Upon further reassessment patient was feeling much better.  Patient instructed on symptomatic management given a prescription for Norco and Zofran and to follow-up with primary care for the incidental finding on her liver.      Data interpreted: Nursing notes reviewed, vital signs reviewed.  Labs independently interpreted by me (CBC, CMP, lipase, UA, troponin, ABG, lactic acid, procalcitonin).  Imaging independently interpreted by me (x-ray, CT scan).  EKG independently interpreted by me.  O2 saturation:    Counseling: Discussed the results above with the patient regarding need for admission or discharge.  Patient  understands and agrees plan of care.      -----  ED Disposition       None          Final diagnoses:   None     Your Follow-Up Providers    Follow-up information has not been specified.       Contact information for after-discharge care    Follow-up information has not been specified.          Your medication list        ASK your doctor about these medications        Instructions Last Dose Given Next Dose Due   gabapentin 100 MG capsule  Commonly known as: NEURONTIN      Take 1 capsule by mouth Every Night. Take 1 at bedtime for a week, then 2 at bedtime       ondansetron 4 MG tablet  Commonly known as: Zofran      Take 1 tablet by mouth Every 8 (Eight) Hours As Needed for Nausea or Vomiting.       ondansetron ODT 4 MG disintegrating tablet  Commonly known as: ZOFRAN-ODT      Place 1 tablet on the tongue Every 8 (Eight) Hours As Needed for Nausea or Vomiting.

## 2024-08-21 ENCOUNTER — OFFICE VISIT (OUTPATIENT)
Dept: FAMILY MEDICINE CLINIC | Facility: CLINIC | Age: 44
End: 2024-08-21
Payer: COMMERCIAL

## 2024-08-21 VITALS
WEIGHT: 166.2 LBS | BODY MASS INDEX: 32.63 KG/M2 | TEMPERATURE: 97.7 F | OXYGEN SATURATION: 99 % | SYSTOLIC BLOOD PRESSURE: 100 MMHG | HEART RATE: 54 BPM | DIASTOLIC BLOOD PRESSURE: 68 MMHG | RESPIRATION RATE: 14 BRPM | HEIGHT: 60 IN

## 2024-08-21 DIAGNOSIS — Z00.00 ANNUAL PHYSICAL EXAM: Primary | ICD-10-CM

## 2024-08-21 DIAGNOSIS — Z13.29 SCREENING FOR ENDOCRINE DISORDER: ICD-10-CM

## 2024-08-21 DIAGNOSIS — R06.83 SNORING: ICD-10-CM

## 2024-08-21 DIAGNOSIS — K76.9 LESION OF LIVER GREATER THAN 1 CM IN DIAMETER: ICD-10-CM

## 2024-08-21 DIAGNOSIS — G47.9 RESTLESS SLEEPER: ICD-10-CM

## 2024-08-21 PROCEDURE — 99386 PREV VISIT NEW AGE 40-64: CPT | Performed by: NURSE PRACTITIONER

## 2024-08-21 PROCEDURE — 99213 OFFICE O/P EST LOW 20 MIN: CPT | Performed by: NURSE PRACTITIONER

## 2024-08-21 NOTE — PROGRESS NOTES
Patient Name: Roxana Knutson  : 1980   MRN: [id number]     Chief Complaint:    Chief Complaint   Patient presents with    Annual Exam       History of Present Illness: Roxana Knutson is a 44 y.o. female who is here today for their annual health maintenance and physical. Overall health is good  Went to oral surgery and found to have a possible narrow airway, concerned about this  She is still having palpitations at night, not waking feeling rested, snoring at nighttime, restless sleeping habits  26 found to have a kidney stone, and on ct incidental finding of liver lesion was present, needing further testing.    Owns her own business    No tobacco, alcohol, illicit drugs,  No eye correction  Dentist every 6 months  Well balanced diet.          Review of Systems:   Review of Systems   Psychiatric/Behavioral:  Positive for sleep disturbance.        Past Medical History, Social History, Family History and Care Team were all reviewed with patient and updated as appropriate.     Medications:   No current outpatient medications on file.    Allergies:   No Known Allergies      Depression: PHQ-2 Depression Screening  PHQ-2 Total Score: 0   PHQ-9 Total Score: 0     Intimate partner violence: (Screen on initial visit, pregnant women, women with injuries, older adult with injury or evidence of neglect):  Violence can be a problem in many people's lives, so I now ask every patient about trauma or abuse they may have experienced in a relationship.  Stress/Safety - Do you feel safe in your relationship?  Afraid/Abused - Have you ever been in a relationship where you were threatened, hurt, or afraid?  Friend/Family - Are your friends aware you have been hurt?  Emergency Plan - Do you have a safe place to go and the resources you need in an emergency?    Osteoporosis:   Ost menopausal women < 65 with RF (advancing age, previous fracture, glucocorticoid therapy, parental hip fracture, low body weight,  "current cigarette smoking, excessive alcohol consumption, rheumatoid arthritis, secondary osteoporosis [hypogonadism/premature menopause, malabsorption, chronic liver disease, IBD]).  All women 65 or older      Physical Exam:  Vital Signs:   Vitals:    08/21/24 1019   BP: 100/68   Pulse: 54   Resp: 14   Temp: 97.7 °F (36.5 °C)   SpO2: 99%   Weight: 75.4 kg (166 lb 3.2 oz)   Height: 152.4 cm (60\")     Body mass index is 32.46 kg/m².     Physical Exam  Vitals and nursing note reviewed.   Constitutional:       General: She is awake.      Appearance: Normal appearance. She is well-developed.   HENT:      Head: Normocephalic and atraumatic.      Right Ear: Tympanic membrane, ear canal and external ear normal.      Left Ear: Tympanic membrane, ear canal and external ear normal.      Nose: Nose normal.      Mouth/Throat:      Mouth: Mucous membranes are moist.      Pharynx: Oropharynx is clear.   Eyes:      Extraocular Movements: Extraocular movements intact.      Conjunctiva/sclera: Conjunctivae normal.      Pupils: Pupils are equal, round, and reactive to light.   Neck:      Thyroid: No thyromegaly.   Cardiovascular:      Rate and Rhythm: Normal rate and regular rhythm.      Pulses: Normal pulses.      Heart sounds: Normal heart sounds.   Pulmonary:      Effort: Pulmonary effort is normal.      Breath sounds: Normal breath sounds.   Abdominal:      General: Bowel sounds are normal.      Palpations: Abdomen is soft.   Musculoskeletal:         General: Normal range of motion.      Cervical back: Normal range of motion and neck supple.      Right lower leg: No edema.      Left lower leg: No edema.   Lymphadenopathy:      Cervical: No cervical adenopathy.   Skin:     General: Skin is warm.      Capillary Refill: Capillary refill takes less than 2 seconds.   Neurological:      General: No focal deficit present.      Mental Status: She is alert and oriented to person, place, and time.   Psychiatric:         Mood and Affect: " Mood normal.         Behavior: Behavior normal. Behavior is cooperative.         Thought Content: Thought content normal.         Judgment: Judgment normal.         Procedures      Assessment/Plan:   Diagnoses and all orders for this visit:    1. Annual physical exam (Primary)  Assessment & Plan:  Health maintenance:  Continue routine health maintenance including routine dentistry, eye exam, safety seatbelt use, exercise, and proper nutrition.   Exercise 3-4 times a week, 30-45 mins a day  Increase water intake  Monitor for acute illnesses      Orders:  -     Comprehensive Metabolic Panel  -     CBC Auto Differential  -     Hemoglobin A1c  -     T4, Free  -     TSH  -     Vitamin D,25-Hydroxy  -     Vitamin B12 & Folate    2. Screening for endocrine disorder    3. Snoring  Comments:  home sleep study ordered  Orders:  -     Home Sleep Study; Future    4. Restless sleeper  Comments:  home sleep study ordered  Orders:  -     Home Sleep Study; Future    5. Lesion of liver greater than 1 cm in diameter  Comments:  ordering MRCP for further diagnosis of liver lesion  Orders:  -     MRI abdomen w wo contrast mrcp; Future    Other orders  -     CBC & Differential       Health Maintenance, Female  Adopting a healthy lifestyle and getting preventive care can go a long way to promote health and wellness. Talk with your health care provider about what schedule of regular examinations is right for you. This is a good chance for you to check in with your provider about disease prevention and staying healthy.  In between checkups, there are plenty of things you can do on your own. Experts have done a lot of research about which lifestyle changes and preventive measures are most likely to keep you healthy. Ask your health care provider for more information.  Weight and diet  Eat a healthy diet  Be sure to include plenty of vegetables, fruits, low-fat dairy products, and lean protein.  Do not eat a lot of foods high in solid fats,  added sugars, or salt.  Get regular exercise. This is one of the most important things you can do for your health.  Most adults should exercise for at least 150 minutes each week. The exercise should increase your heart rate and make you sweat (moderate-intensity exercise).  Most adults should also do strengthening exercises at least twice a week. This is in addition to the moderate-intensity exercise.     Maintain a healthy weight  Body mass index (BMI) is a measurement that can be used to identify possible weight problems. It estimates body fat based on height and weight. Your health care provider can help determine your BMI and help you achieve or maintain a healthy weight.  For females 20 years of age and older:  A BMI below 18.5 is considered underweight.  A BMI of 18.5 to 24.9 is normal.  A BMI of 25 to 29.9 is considered overweight.  A BMI of 30 and above is considered obese.     Watch levels of cholesterol and blood lipids  You should start having your blood tested for lipids and cholesterol at 20 years of age, then have this test every 5 years.  You may need to have your cholesterol levels checked more often if:  Your lipid or cholesterol levels are high.  You are older than 50 years of age.  You are at high risk for heart disease.     Cancer screening  Lung Cancer  Lung cancer screening is recommended for adults 55-80 years old who are at high risk for lung cancer because of a history of smoking.  A yearly low-dose CT scan of the lungs is recommended for people who:  Currently smoke.  Have quit within the past 15 years.  Have at least a 30-pack-year history of smoking. A pack year is smoking an average of one pack of cigarettes a day for 1 year.  Yearly screening should continue until it has been 15 years since you quit.  Yearly screening should stop if you develop a health problem that would prevent you from having lung cancer treatment.     Breast Cancer  Practice breast self-awareness. This means  understanding how your breasts normally appear and feel.  It also means doing regular breast self-exams. Let your health care provider know about any changes, no matter how small.  If you are in your 20s or 30s, you should have a clinical breast exam (CBE) by a health care provider every 1-3 years as part of a regular health exam.  If you are 40 or older, have a CBE every year. Also consider having a breast X-ray (mammogram) every year.  If you have a family history of breast cancer, talk to your health care provider about genetic screening.  If you are at high risk for breast cancer, talk to your health care provider about having an MRI and a mammogram every year.  Breast cancer gene (BRCA) assessment is recommended for women who have family members with BRCA-related cancers. BRCA-related cancers include:  Breast.  Ovarian.  Tubal.  Peritoneal cancers.  Results of the assessment will determine the need for genetic counseling and BRCA1 and BRCA2 testing.     Cervical Cancer  Your health care provider may recommend that you be screened regularly for cancer of the pelvic organs (ovaries, uterus, and vagina). This screening involves a pelvic examination, including checking for microscopic changes to the surface of your cervix (Pap test). You may be encouraged to have this screening done every 3 years, beginning at age 21.  For women ages 30-65, health care providers may recommend pelvic exams and Pap testing every 3 years, or they may recommend the Pap and pelvic exam, combined with testing for human papilloma virus (HPV), every 5 years. Some types of HPV increase your risk of cervical cancer. Testing for HPV may also be done on women of any age with unclear Pap test results.  Other health care providers may not recommend any screening for nonpregnant women who are considered low risk for pelvic cancer and who do not have symptoms. Ask your health care provider if a screening pelvic exam is right for you.  If you have  had past treatment for cervical cancer or a condition that could lead to cancer, you need Pap tests and screening for cancer for at least 20 years after your treatment. If Pap tests have been discontinued, your risk factors (such as having a new sexual partner) need to be reassessed to determine if screening should resume. Some women have medical problems that increase the chance of getting cervical cancer. In these cases, your health care provider may recommend more frequent screening and Pap tests.     Colorectal Cancer  This type of cancer can be detected and often prevented.  Routine colorectal cancer screening usually begins at 50 years of age and continues through 75 years of age.  Your health care provider may recommend screening at an earlier age if you have risk factors for colon cancer.  Your health care provider may also recommend using home test kits to check for hidden blood in the stool.  A small camera at the end of a tube can be used to examine your colon directly (sigmoidoscopy or colonoscopy). This is done to check for the earliest forms of colorectal cancer.  Routine screening usually begins at age 50.  Direct examination of the colon should be repeated every 5-10 years through 75 years of age. However, you may need to be screened more often if early forms of precancerous polyps or small growths are found.     Skin Cancer  Check your skin from head to toe regularly.  Tell your health care provider about any new moles or changes in moles, especially if there is a change in a mole's shape or color.  Also tell your health care provider if you have a mole that is larger than the size of a pencil eraser.  Always use sunscreen. Apply sunscreen liberally and repeatedly throughout the day.  Protect yourself by wearing long sleeves, pants, a wide-brimmed hat, and sunglasses whenever you are outside.     Heart disease, diabetes, and high blood pressure  High blood pressure causes heart disease and increases  the risk of stroke. High blood pressure is more likely to develop in:  People who have blood pressure in the high end of the normal range (130-139/85-89 mm Hg).  People who are overweight or obese.  People who are .  If you are 18-39 years of age, have your blood pressure checked every 3-5 years. If you are 40 years of age or older, have your blood pressure checked every year. You should have your blood pressure measured twice--once when you are at a hospital or clinic, and once when you are not at a hospital or clinic. Record the average of the two measurements. To check your blood pressure when you are not at a hospital or clinic, you can use:  An automated blood pressure machine at a pharmacy.  A home blood pressure monitor.  If you are between 55 years and 79 years old, ask your health care provider if you should take aspirin to prevent strokes.  Have regular diabetes screenings. This involves taking a blood sample to check your fasting blood sugar level.  If you are at a normal weight and have a low risk for diabetes, have this test once every three years after 45 years of age.  If you are overweight and have a high risk for diabetes, consider being tested at a younger age or more often.  Preventing infection  Hepatitis B  If you have a higher risk for hepatitis B, you should be screened for this virus. You are considered at high risk for hepatitis B if:  You were born in a country where hepatitis B is common. Ask your health care provider which countries are considered high risk.  Your parents were born in a high-risk country, and you have not been immunized against hepatitis B (hepatitis B vaccine).  You have HIV or AIDS.  You use needles to inject street drugs.  You live with someone who has hepatitis B.  You have had sex with someone who has hepatitis B.  You get hemodialysis treatment.  You take certain medicines for conditions, including cancer, organ transplantation, and autoimmune  conditions.     Hepatitis C  Blood testing is recommended for:  Everyone born from 1945 through 1965.  Anyone with known risk factors for hepatitis C.     Sexually transmitted infections (STIs)  You should be screened for sexually transmitted infections (STIs) including gonorrhea and chlamydia if:  You are sexually active and are younger than 24 years of age.  You are older than 24 years of age and your health care provider tells you that you are at risk for this type of infection.  Your sexual activity has changed since you were last screened and you are at an increased risk for chlamydia or gonorrhea. Ask your health care provider if you are at risk.  If you do not have HIV, but are at risk, it may be recommended that you take a prescription medicine daily to prevent HIV infection. This is called pre-exposure prophylaxis (PrEP). You are considered at risk if:  You are sexually active and do not regularly use condoms or know the HIV status of your partner(s).  You take drugs by injection.  You are sexually active with a partner who has HIV.     Talk with your health care provider about whether you are at high risk of being infected with HIV. If you choose to begin PrEP, you should first be tested for HIV. You should then be tested every 3 months for as long as you are taking PrEP.  Pregnancy  If you are premenopausal and you may become pregnant, ask your health care provider about preconception counseling.  If you may become pregnant, take 400 to 800 micrograms (mcg) of folic acid every day.  If you want to prevent pregnancy, talk to your health care provider about birth control (contraception).  Osteoporosis and menopause  Osteoporosis is a disease in which the bones lose minerals and strength with aging. This can result in serious bone fractures. Your risk for osteoporosis can be identified using a bone density scan.  If you are 65 years of age or older, or if you are at risk for osteoporosis and fractures, ask  your health care provider if you should be screened.  Ask your health care provider whether you should take a calcium or vitamin D supplement to lower your risk for osteoporosis.  Menopause may have certain physical symptoms and risks.  Hormone replacement therapy may reduce some of these symptoms and risks.  Talk to your health care provider about whether hormone replacement therapy is right for you.  Follow these instructions at home:  Schedule regular health, dental, and eye exams.  Stay current with your immunizations.  Do not use any tobacco products including cigarettes, chewing tobacco, or electronic cigarettes.  If you are pregnant, do not drink alcohol.  If you are breastfeeding, limit how much and how often you drink alcohol.  Limit alcohol intake to no more than 1 drink per day for nonpregnant women. One drink equals 12 ounces of beer, 5 ounces of wine, or 1½ ounces of hard liquor.  Do not use street drugs.  Do not share needles.  Ask your health care provider for help if you need support or information about quitting drugs.  Tell your health care provider if you often feel depressed.  Tell your health care provider if you have ever been abused or do not feel safe at home.  This information is not intended to replace advice given to you by your health care provider. Make sure you discuss any questions you have with your health care provider.  Document Released: 07/02/2012 Document Revised: 05/25/2017 Document Reviewed: 09/20/2016  [employer] Patient Education © 2018 [employer]      Follow Up:   Return in about 4 weeks (around 9/18/2024).    Healthcare Maintenance:   Counseling provided on preventative care and vaccines.   Roxana Knutson voices understanding and acceptance of this advice and will call back with any further questions or concerns. AVS with preventive healthcare tips printed for patient.     Laureen Kerns. PINKY   [facility]

## 2024-08-22 LAB
25(OH)D3+25(OH)D2 SERPL-MCNC: 31.9 NG/ML (ref 30–100)
ALBUMIN SERPL-MCNC: 4.1 G/DL (ref 3.9–4.9)
ALP SERPL-CCNC: 69 IU/L (ref 44–121)
ALT SERPL-CCNC: 25 IU/L (ref 0–32)
AST SERPL-CCNC: 19 IU/L (ref 0–40)
BASOPHILS # BLD AUTO: 0 X10E3/UL (ref 0–0.2)
BASOPHILS NFR BLD AUTO: 0 %
BILIRUB SERPL-MCNC: <0.2 MG/DL (ref 0–1.2)
BUN SERPL-MCNC: 16 MG/DL (ref 6–24)
BUN/CREAT SERPL: 17 (ref 9–23)
CALCIUM SERPL-MCNC: 9.4 MG/DL (ref 8.7–10.2)
CHLORIDE SERPL-SCNC: 105 MMOL/L (ref 96–106)
CO2 SERPL-SCNC: 25 MMOL/L (ref 20–29)
CREAT SERPL-MCNC: 0.96 MG/DL (ref 0.57–1)
EGFRCR SERPLBLD CKD-EPI 2021: 76 ML/MIN/1.73
EOSINOPHIL # BLD AUTO: 0.1 X10E3/UL (ref 0–0.4)
EOSINOPHIL NFR BLD AUTO: 2 %
ERYTHROCYTE [DISTWIDTH] IN BLOOD BY AUTOMATED COUNT: 12.1 % (ref 11.7–15.4)
FOLATE SERPL-MCNC: 16.7 NG/ML
GLOBULIN SER CALC-MCNC: 1.8 G/DL (ref 1.5–4.5)
GLUCOSE SERPL-MCNC: 83 MG/DL (ref 70–99)
HBA1C MFR BLD: 5.5 % (ref 4.8–5.6)
HCT VFR BLD AUTO: 43.7 % (ref 34–46.6)
HGB BLD-MCNC: 14.3 G/DL (ref 11.1–15.9)
IMM GRANULOCYTES # BLD AUTO: 0 X10E3/UL (ref 0–0.1)
IMM GRANULOCYTES NFR BLD AUTO: 1 %
LYMPHOCYTES # BLD AUTO: 1.5 X10E3/UL (ref 0.7–3.1)
LYMPHOCYTES NFR BLD AUTO: 25 %
MCH RBC QN AUTO: 29.5 PG (ref 26.6–33)
MCHC RBC AUTO-ENTMCNC: 32.7 G/DL (ref 31.5–35.7)
MCV RBC AUTO: 90 FL (ref 79–97)
MONOCYTES # BLD AUTO: 0.3 X10E3/UL (ref 0.1–0.9)
MONOCYTES NFR BLD AUTO: 6 %
NEUTROPHILS # BLD AUTO: 4 X10E3/UL (ref 1.4–7)
NEUTROPHILS NFR BLD AUTO: 66 %
PLATELET # BLD AUTO: 215 X10E3/UL (ref 150–450)
POTASSIUM SERPL-SCNC: 4.4 MMOL/L (ref 3.5–5.2)
PROT SERPL-MCNC: 5.9 G/DL (ref 6–8.5)
RBC # BLD AUTO: 4.85 X10E6/UL (ref 3.77–5.28)
SODIUM SERPL-SCNC: 141 MMOL/L (ref 134–144)
T4 FREE SERPL-MCNC: 0.99 NG/DL (ref 0.82–1.77)
TSH SERPL DL<=0.005 MIU/L-ACNC: 2.9 UIU/ML (ref 0.45–4.5)
VIT B12 SERPL-MCNC: 837 PG/ML (ref 232–1245)
WBC # BLD AUTO: 6 X10E3/UL (ref 3.4–10.8)

## 2024-08-29 PROBLEM — Z00.00 ANNUAL PHYSICAL EXAM: Status: ACTIVE | Noted: 2024-08-29

## 2024-09-05 DIAGNOSIS — G47.33 OSA (OBSTRUCTIVE SLEEP APNEA): Primary | ICD-10-CM

## 2024-11-26 ENCOUNTER — OFFICE VISIT (OUTPATIENT)
Dept: SLEEP MEDICINE | Facility: CLINIC | Age: 44
End: 2024-11-26
Payer: COMMERCIAL

## 2024-11-26 VITALS
DIASTOLIC BLOOD PRESSURE: 68 MMHG | HEIGHT: 60 IN | WEIGHT: 173 LBS | TEMPERATURE: 98.4 F | HEART RATE: 68 BPM | SYSTOLIC BLOOD PRESSURE: 100 MMHG | OXYGEN SATURATION: 99 % | BODY MASS INDEX: 33.96 KG/M2

## 2024-11-26 DIAGNOSIS — G47.33 OSA (OBSTRUCTIVE SLEEP APNEA): Primary | ICD-10-CM

## 2024-11-26 PROCEDURE — 99213 OFFICE O/P EST LOW 20 MIN: CPT | Performed by: NURSE PRACTITIONER

## 2024-11-26 RX ORDER — CETIRIZINE HYDROCHLORIDE 5 MG/1
5 TABLET ORAL DAILY
COMMUNITY

## 2024-11-26 NOTE — PROGRESS NOTES
Chief Complaint:   Chief Complaint   Patient presents with    Sleeping Problem    Snoring       HPI:    Roxana Knutson is a 44 y.o. female here to establish care.  Patient sees PINKY Singh for care.  Patient has snoring, excessive daytime sleepiness, 3-4 morning headaches a week, nonrestorative sleep, heartburn, and sore throat.  Patient was told she had a narrow airway by her dentist and was ordered an HST.  HST did show mild sleep apnea.  We did speak today about the consequences of untreated sleep apnea such as hypertension, atrial fibrillation, stroke, early onset dementia and sudden cardiac death.  We also discussed different therapies available to her such as CPAP and MAD.  Patient does wish to initiate CPAP therapy.    On weekday going to bed at midnight getting up at 9 AM.  On weekend going to bed at midnight getting up at 9:30 AM.  She estimates she is getting 9 hours of sleep nightly.  She goes to sleep within 10 minutes and does go to sleep after taking a cornbread hemp gummy.  She does carry a diagnosis of IC and will get up x 1 to use the restroom.  Patient has an Bondurant score at 12/24.    Social history  This very pleasant 44-year-old female.  Patient is self-employed.  Patient is a non-smoker and will have an alcoholic beverage maybe 3 times a year.  Patient has no caffeine intake.    Family History   Problem Relation Age of Onset    Cancer Mother     Kidney disease Mother     Arthritis Father     Thyroid disease Sister     Breast cancer Paternal Aunt 30    Breast cancer Maternal Grandmother         DX AGE 70'S    Cancer Maternal Grandmother     Diabetes Maternal Grandmother     Cancer Maternal Grandfather     Diabetes Maternal Grandfather     Heart attack Maternal Grandfather     Ovarian cancer Neg Hx      Past Surgical History:   Procedure Laterality Date    HYSTERECTOMY  2018    OOPHORECTOMY Bilateral 2018    REDUCTION MAMMAPLASTY Bilateral     2014             Current medications are:  "  Current Outpatient Medications:     cetirizine (zyrTEC) 5 MG tablet, Take 1 tablet by mouth Daily., Disp: , Rfl: .      The patient's relevant past medical, surgical, family and social history were reviewed and updated in Epic as appropriate.       Review of Systems   Constitutional:  Positive for fatigue.   HENT:  Positive for congestion, dental problem, ear discharge, ear pain, sinus pressure and sneezing.    Respiratory:  Positive for choking.    Cardiovascular:  Positive for palpitations.   Genitourinary:  Positive for flank pain.   Allergic/Immunologic: Positive for environmental allergies.   Psychiatric/Behavioral:  Positive for decreased concentration and sleep disturbance.    All other systems reviewed and are negative.        Objective:    Physical Exam  Constitutional:       Appearance: Normal appearance.   HENT:      Head: Normocephalic and atraumatic.      Mouth/Throat:      Mouth: Mucous membranes are moist.      Pharynx: Oropharynx is clear.      Comments: Mallampati 3 anatomy  Cardiovascular:      Rate and Rhythm: Regular rhythm. Bradycardia present.   Pulmonary:      Effort: Pulmonary effort is normal.      Breath sounds: Normal breath sounds.   Skin:     General: Skin is warm and dry.   Neurological:      Mental Status: She is alert and oriented to person, place, and time.   Psychiatric:         Mood and Affect: Mood normal.         Behavior: Behavior normal.         Thought Content: Thought content normal.         Judgment: Judgment normal.         /68   Pulse 68   Temp 98.4 °F (36.9 °C)   Ht 152.4 cm (60\")   Wt 78.5 kg (173 lb)   LMP  (LMP Unknown)   SpO2 99%   BMI 33.79 kg/m²       ASSESSMENT/PLAN    Diagnoses and all orders for this visit:    1. EJSUS (obstructive sleep apnea) (Primary)  -     PAP Therapy      Order to initiate CPAP therapy and I will see patient back in 31 to 90 days.      Signed by  PINKY Hines    November 26, 2024      CC: Laureen Kerns APRN         " [place], Laureen CUELLAR, PINKY

## 2024-12-03 ENCOUNTER — TRANSCRIBE ORDERS (OUTPATIENT)
Dept: ADMINISTRATIVE | Facility: HOSPITAL | Age: 44
End: 2024-12-03
Payer: COMMERCIAL

## 2024-12-03 DIAGNOSIS — Z12.31 VISIT FOR SCREENING MAMMOGRAM: Primary | ICD-10-CM

## 2024-12-26 LAB
NCCN CRITERIA FLAG: ABNORMAL
TYRER CUZICK SCORE: 16.5

## 2024-12-27 ENCOUNTER — HOSPITAL ENCOUNTER (OUTPATIENT)
Facility: HOSPITAL | Age: 44
Discharge: HOME | End: 2024-12-27
Payer: COMMERCIAL

## 2024-12-27 DIAGNOSIS — Z12.31 VISIT FOR SCREENING MAMMOGRAM: ICD-10-CM

## 2024-12-27 DIAGNOSIS — K76.9 LESION OF LIVER GREATER THAN 1 CM IN DIAMETER: ICD-10-CM

## 2024-12-27 PROCEDURE — A9577 INJ MULTIHANCE: HCPCS | Performed by: NURSE PRACTITIONER

## 2024-12-27 PROCEDURE — 77063 BREAST TOMOSYNTHESIS BI: CPT

## 2024-12-27 PROCEDURE — 77067 SCR MAMMO BI INCL CAD: CPT

## 2024-12-27 PROCEDURE — 74183 MRI ABD W/O CNTR FLWD CNTR: CPT

## 2024-12-27 PROCEDURE — 25510000002 GADOBENATE DIMEGLUMINE 529 MG/ML SOLUTION: Performed by: NURSE PRACTITIONER

## 2024-12-27 RX ADMIN — GADOBENATE DIMEGLUMINE 16 ML: 529 INJECTION, SOLUTION INTRAVENOUS at 09:46

## 2025-01-07 ENCOUNTER — DOCUMENTATION (OUTPATIENT)
Dept: GENETICS | Facility: HOSPITAL | Age: 45
End: 2025-01-07
Payer: COMMERCIAL

## 2025-02-18 ENCOUNTER — OFFICE VISIT (OUTPATIENT)
Dept: SLEEP MEDICINE | Facility: CLINIC | Age: 45
End: 2025-02-18
Payer: COMMERCIAL

## 2025-02-18 VITALS
DIASTOLIC BLOOD PRESSURE: 68 MMHG | OXYGEN SATURATION: 97 % | HEIGHT: 60 IN | WEIGHT: 169 LBS | BODY MASS INDEX: 33.18 KG/M2 | TEMPERATURE: 98 F | SYSTOLIC BLOOD PRESSURE: 104 MMHG | HEART RATE: 69 BPM

## 2025-02-18 DIAGNOSIS — G47.33 OSA (OBSTRUCTIVE SLEEP APNEA): Primary | ICD-10-CM

## 2025-02-18 PROCEDURE — 99213 OFFICE O/P EST LOW 20 MIN: CPT | Performed by: NURSE PRACTITIONER

## 2025-02-18 NOTE — PROGRESS NOTES
Chief Complaint:   Chief Complaint   Patient presents with    Follow-up    Sleep Apnea       HPI:    Roxana Knutson is a 44 y.o. female here for follow-up of sleep apnea.      Roxana Knutson is a 44 y.o. female who was here to establish care on 11/26/2024.  Patient sees PINKY Singh for care.  Patient had snoring, excessive daytime sleepiness, 3-4 morning headaches a week, nonrestorative sleep, heartburn, and sore throat.  Patient was told she had a narrow airway by her dentist and was ordered an HST.  HST did show mild sleep apnea.  We did speak about the consequences of untreated sleep apnea such as hypertension, atrial fibrillation, stroke, early onset dementia and sudden cardiac death.  We also discussed different therapies available to her such as CPAP and MAD.  Patient did initiate CPAP therapy.  Patient states she is very rarely now having a morning headache.  She does feel better in that regard.  She does wake up at times during the night when she feels the pressure increase of the machine.  We did speak today about the necessity of this and different things she can do to help and not need such a high pressure.  She would also be a great candidate for MAD if she feels she would get to this point.  She does wish to continue CPAP at this time and keep trying.  She seems to be doing very well with her fullface mask and heated tubing.  She gets 8 to 9 hours of sleep and puts her Verdigre score at 14/24.  She is doing well but understands she can call me at any time for any needs.     Current medications are:   Current Outpatient Medications:     cetirizine (zyrTEC) 5 MG tablet, Take 1 tablet by mouth Daily., Disp: , Rfl: .      The patient's relevant past medical, surgical, family and social history were reviewed and updated in Epic as appropriate.       Review of Systems   HENT:  Positive for congestion, dental problem, ear discharge, ear pain, sinus pressure and sneezing.    Respiratory:  Positive for  "apnea and choking.    Cardiovascular:  Positive for palpitations.   Genitourinary:  Positive for flank pain.   Allergic/Immunologic: Positive for environmental allergies.   Psychiatric/Behavioral:  Positive for decreased concentration and sleep disturbance.    All other systems reviewed and are negative.        Objective:    Physical Exam  Constitutional:       Appearance: Normal appearance.   HENT:      Head: Normocephalic and atraumatic.      Mouth/Throat:      Comments: Mallampati 3 anatomy  Cardiovascular:      Rate and Rhythm: Regular rhythm. Bradycardia present.   Pulmonary:      Effort: Pulmonary effort is normal. No respiratory distress.   Skin:     General: Skin is warm and dry.   Neurological:      Mental Status: She is alert and oriented to person, place, and time.   Psychiatric:         Mood and Affect: Mood normal.         Behavior: Behavior normal.         Thought Content: Thought content normal.         Judgment: Judgment normal.       /68   Pulse 69   Temp 98 °F (36.7 °C)   Ht 152.4 cm (60\")   Wt 76.7 kg (169 lb)   LMP  (LMP Unknown)   SpO2 97%   BMI 33.01 kg/m²     CPAP Report  63/63 days of use  Greater than 4-hour use 90%  Setting 8-18  95th percentile pressure 12.5  AHI of 2.2    The patient continues to use and benefit from CPAP therapy.    ASSESSMENT/PLAN    Diagnoses and all orders for this visit:    1. JESUS (obstructive sleep apnea) (Primary)  -     PAP Therapy        Counseled patient regarding multimodal approach with healthy nutrition, healthy sleep, regular physical activity, social activities, counseling, and medications. Encouraged to practice lateral sleep position. Avoid alcohol and sedatives close to bedtime.      Refill supplies x 1 year.  Return to clinic 1 year or sooner as symptoms warrant.  Signed by  PINKY Hines    February 18, 2025      CC: Laureen Kerns APRN         No ref. provider found      "